# Patient Record
Sex: MALE
[De-identification: names, ages, dates, MRNs, and addresses within clinical notes are randomized per-mention and may not be internally consistent; named-entity substitution may affect disease eponyms.]

---

## 2018-10-22 ENCOUNTER — HOSPITAL ENCOUNTER (OUTPATIENT)
Dept: HOSPITAL 75 - RAD | Age: 53
Discharge: HOME | End: 2018-10-22
Attending: NURSE PRACTITIONER

## 2018-10-22 PROCEDURE — 72040 X-RAY EXAM NECK SPINE 2-3 VW: CPT

## 2018-10-22 PROCEDURE — 72141 MRI NECK SPINE W/O DYE: CPT

## 2018-10-22 NOTE — DIAGNOSTIC IMAGING REPORT
PROCEDURE: MR imaging cervical spine without contrast.



TECHNIQUE: Multiplanar, multisequence MR imaging of the cervical

spine was performed without contrast.



INDICATION: Fall, neck and shoulder pain since the injury.



COMPARISON: No previous for direct comparison. Study correlated

with plain films performed earlier the same date.



FINDINGS: The cervical spinal cord itself has a normal volume,

normal morphology, and normal signal intensity. No bone contusion

or marrow edema. Cervical body heights maintained. There is no

evidence for ligamentous disruption. The facet relationships have

an unremarkable appearance. The C5-C6 level shows midline disc

protrusion indenting the ventral thecal sac with mild-to-moderate

canal stenosis. Disc material is asymmetric greater right where

there is an at least moderate severity of right-sided foraminal

encroachment. The C6-C7 disc shows broad-based bulging

posteriorly indenting the ventral thecal sac with mild canal

stenosis but no substantial foraminal narrowing. C7-T1 level does

reveal some generalized bulge without focal herniation or

resultant stenosis.



IMPRESSION: Normal cord. No acute bony pathology. Bulging disc

material eccentric to the right results in right foraminal

greater than central canal stenosis at the C5-C6 level. No

evidence for ligamentous injury or epidural hematoma.



Dictated by: 



  Dictated on workstation # [id number]

## 2018-10-22 NOTE — DIAGNOSTIC IMAGING REPORT
INDICATION: Fall and neck pain.



Time of exam: 10:22 AM



3 views of the cervical spine were obtained. There is slight

reversal of the normal cervical lordotic curvature with minimal

anterolisthesis of C4 on C5. Vertebral body heights are

maintained. There is some generalized cervical spondylosis with

variable disc space narrowing and marginal spurring. The

prevertebral tissues are within normal limits. The odontoid is

intact. No fractures are seen.



IMPRESSION: Cervical spondylosis and listhesis with slight

reversal of the normal curvature. No acute bony abnormality is

detected.



Dictated by: 



  Dictated on workstation # [id number]